# Patient Record
Sex: MALE | Race: BLACK OR AFRICAN AMERICAN | NOT HISPANIC OR LATINO | URBAN - METROPOLITAN AREA
[De-identification: names, ages, dates, MRNs, and addresses within clinical notes are randomized per-mention and may not be internally consistent; named-entity substitution may affect disease eponyms.]

---

## 2020-07-24 ENCOUNTER — HISTORICAL (OUTPATIENT)
Dept: ADMINISTRATIVE | Facility: HOSPITAL | Age: 60
End: 2020-07-24

## 2021-05-12 ENCOUNTER — HISTORICAL (OUTPATIENT)
Dept: ADMINISTRATIVE | Facility: HOSPITAL | Age: 61
End: 2021-05-12

## 2021-05-12 LAB
ABS NEUT (OLG): 1.7 X10(3)/MCL (ref 2.1–9.2)
ERYTHROCYTE [DISTWIDTH] IN BLOOD BY AUTOMATED COUNT: 14.4 % (ref 11.5–17)
HCT VFR BLD AUTO: 40.4 % (ref 42–52)
HGB BLD-MCNC: 13.1 GM/DL (ref 14–18)
LYMPHOCYTES # BLD AUTO: 2.2 X10(3)/MCL (ref 0.6–3.4)
LYMPHOCYTES NFR BLD AUTO: 50.5 % (ref 13–40)
MCH RBC QN AUTO: 25.1 PG (ref 27–31.2)
MCHC RBC AUTO-ENTMCNC: 32 GM/DL (ref 32–36)
MCV RBC AUTO: 77 FL (ref 80–94)
MONOCYTES # BLD AUTO: 0.4 X10(3)/MCL (ref 0.1–1.3)
MONOCYTES NFR BLD AUTO: 9.1 % (ref 0.1–24)
NEUTROPHILS NFR BLD AUTO: 40.4 % (ref 47–80)
PLATELET # BLD AUTO: 201 X10(3)/MCL (ref 130–400)
PMV BLD AUTO: 8.7 FL (ref 9.4–12.4)
RBC # BLD AUTO: 5.22 X10(6)/MCL (ref 4.7–6.1)
WBC # SPEC AUTO: 4.3 X10(3)/MCL (ref 4.5–11.5)

## 2021-08-31 ENCOUNTER — HISTORICAL (OUTPATIENT)
Dept: ADMINISTRATIVE | Facility: HOSPITAL | Age: 61
End: 2021-08-31

## 2021-08-31 LAB
ABS NEUT (OLG): 3.6 X10(3)/MCL (ref 2.1–9.2)
ALBUMIN SERPL-MCNC: 3.9 GM/DL (ref 3.4–5)
ALBUMIN/GLOB SERPL: 1.44 {RATIO} (ref 1.5–2.5)
ALP SERPL-CCNC: 50 UNIT/L (ref 38–126)
ALT SERPL-CCNC: 30 UNIT/L (ref 7–52)
APPEARANCE, UA: CLEAR
AST SERPL-CCNC: 23 UNIT/L (ref 15–37)
BACTERIA #/AREA URNS AUTO: ABNORMAL /HPF
BILIRUB SERPL-MCNC: 0.5 MG/DL (ref 0.2–1)
BILIRUB UR QL STRIP: NEGATIVE MG/DL
BILIRUBIN DIRECT+TOT PNL SERPL-MCNC: 0.1 MG/DL (ref 0–0.5)
BILIRUBIN DIRECT+TOT PNL SERPL-MCNC: 0.4 MG/DL
BUN SERPL-MCNC: 13 MG/DL (ref 7–18)
CALCIUM SERPL-MCNC: 8.8 MG/DL (ref 8.5–10.1)
CHLORIDE SERPL-SCNC: 102 MMOL/L (ref 98–107)
CHOLEST SERPL-MCNC: 177 MG/DL (ref 0–200)
CHOLEST/HDLC SERPL: 4.4 {RATIO}
CO2 SERPL-SCNC: 33 MMOL/L (ref 21–32)
COLOR UR: YELLOW
CREAT SERPL-MCNC: 1.08 MG/DL (ref 0.6–1.3)
ERYTHROCYTE [DISTWIDTH] IN BLOOD BY AUTOMATED COUNT: 14.3 % (ref 11.5–17)
EST CREAT CLEARANCE SER (OHS): 101.59 ML/MIN
EST. AVERAGE GLUCOSE BLD GHB EST-MCNC: 126 MG/DL
GLOBULIN SER-MCNC: 2.7 GM/DL (ref 1.2–3)
GLUCOSE (UA): NEGATIVE MG/DL
GLUCOSE SERPL-MCNC: 106 MG/DL (ref 74–106)
HBA1C MFR BLD: 6 % (ref 4.4–6.4)
HCT VFR BLD AUTO: 42.7 % (ref 42–52)
HDLC SERPL-MCNC: 40 MG/DL (ref 35–60)
HGB BLD-MCNC: 13.9 GM/DL (ref 14–18)
HGB UR QL STRIP: NEGATIVE UNIT/L
KETONES UR QL STRIP: ABNORMAL MG/DL
LDLC SERPL CALC-MCNC: 97 MG/DL (ref 0–129)
LEUKOCYTE ESTERASE UR QL STRIP: NEGATIVE UNIT/L
LYMPHOCYTES # BLD AUTO: 2.1 X10(3)/MCL (ref 0.6–3.4)
LYMPHOCYTES NFR BLD AUTO: 34.8 % (ref 13–40)
MCH RBC QN AUTO: 24.7 PG (ref 27–31.2)
MCHC RBC AUTO-ENTMCNC: 33 GM/DL (ref 32–36)
MCV RBC AUTO: 76 FL (ref 80–94)
MONOCYTES # BLD AUTO: 0.4 X10(3)/MCL (ref 0.1–1.3)
MONOCYTES NFR BLD AUTO: 7.3 % (ref 0.1–24)
NEUTROPHILS NFR BLD AUTO: 57.9 % (ref 47–80)
NITRITE UR QL STRIP.AUTO: NEGATIVE
PH UR STRIP: 7 [PH]
PLATELET # BLD AUTO: 267 X10(3)/MCL (ref 130–400)
PMV BLD AUTO: 8.5 FL (ref 9.4–12.4)
POTASSIUM SERPL-SCNC: 3.7 MMOL/L (ref 3.5–5.1)
PROT SERPL-MCNC: 6.6 GM/DL (ref 6.4–8.2)
PROT UR QL STRIP: ABNORMAL MG/DL
PSA SERPL-MCNC: 0.65 NG/ML (ref 0–4.5)
RBC # BLD AUTO: 5.62 X10(6)/MCL (ref 4.7–6.1)
RBC #/AREA URNS HPF: ABNORMAL /HPF
SODIUM SERPL-SCNC: 140 MMOL/L (ref 136–145)
SP GR UR STRIP: 1.02
SQUAMOUS EPITHELIAL, UA: ABNORMAL /LPF
TRIGL SERPL-MCNC: 133 MG/DL (ref 30–150)
UROBILINOGEN UR STRIP-ACNC: 0.2 MG/DL
VLDLC SERPL CALC-MCNC: 26.6 MG/DL
WBC # SPEC AUTO: 6.1 X10(3)/MCL (ref 4.5–11.5)
WBC #/AREA URNS AUTO: ABNORMAL /[HPF]

## 2022-01-26 ENCOUNTER — HISTORICAL (OUTPATIENT)
Dept: ADMINISTRATIVE | Facility: HOSPITAL | Age: 62
End: 2022-01-26

## 2022-02-07 ENCOUNTER — HISTORICAL (OUTPATIENT)
Dept: ADMINISTRATIVE | Facility: HOSPITAL | Age: 62
End: 2022-02-07

## 2022-02-07 LAB
CBG: 102 (ref 70–115)
CBG: 95 (ref 70–115)

## 2022-02-23 ENCOUNTER — HISTORICAL (OUTPATIENT)
Dept: ADMINISTRATIVE | Facility: HOSPITAL | Age: 62
End: 2022-02-23

## 2022-02-23 LAB
CBG: 86 (ref 70–115)
CBG: 95 (ref 70–115)

## 2022-04-09 ENCOUNTER — HISTORICAL (OUTPATIENT)
Dept: ADMINISTRATIVE | Facility: HOSPITAL | Age: 62
End: 2022-04-09
Payer: COMMERCIAL

## 2022-04-25 VITALS
OXYGEN SATURATION: 98 % | SYSTOLIC BLOOD PRESSURE: 126 MMHG | DIASTOLIC BLOOD PRESSURE: 82 MMHG | WEIGHT: 232.56 LBS | HEIGHT: 70 IN | BODY MASS INDEX: 33.29 KG/M2

## 2022-05-02 NOTE — HISTORICAL [facility]
This is a historical note converted from [employer]. Formatting and pictures may have been removed.  Please reference [employer]ka for original formatting and attached multimedia. History of Present Illness  Pt presents for Wellness exam.?  He has been feeling well.  Sleeps well.  Appetite is good.  He walks a few miles 3 x week.  No tobacco.  No alcohol.  No coffee/soft drinks.  Colonoscopy 5/2021: Dr Andrew, normal,?follow up 10 years  He is remarried.  .  Cardiologist: Dr Gomez; October?2020.  Review of Systems  Constitutional:??no?weight gain,??no?weight loss,??no?fatigue, ?no?fever, ?no?chills, ?no?weakness, ?no?trouble sleeping  Eyes: ?no?vision loss/changes,??+?glasses, readers,??no?pain,??no?redness,??no?blurry or double vision,??no?flashing lights,??no?glaucoma,??no?cataracts  Last eye exam:? 2020  Neck: ?no?lymphadenopathy,??no?thyroid abnormalities,??no?bruits,??no?stiffness  Ears:??no?decreased hearing,??no?tinnitus,??no?earache,??no?drainage?  Nose:??no?congestion,??no?rhinorrhea,??no?epistaxis,??no?sinus pressure  Throat/Oral:??no?sore throat,??no?hoarseness, ?no?dental caries,??no?gum bleeding,??no?oral lesions  Cardiovascular:??no?chest pain, palpitations, or tightness,??no?dyspnea with exertion,??no?orthopnea,??no?paroxysmal nocturnal dyspnea, + hypertension, + hypercholesteremia/hyperlipidemia, + carotid artery stenosis  Respiratory:??no?cough,??no?sputum,??no?hemoptysis,??no?dyspnea,??no?wheezing,??no?pleuritic chest pain?  Gastrointestinal:??no?abdominal pain,??no?nausea,??no?vomiting,??no?heartburn,??no?dysphagia or odynophagia,??no?diarrhea,??no?constipation,??no?melena,??no?hematochezia,?no?jaundice  Urinary:??no?frequency,??no?urgency,??no?burning or pain,?no?hematuria,??no?incontinence,??no?hesitancy,??no?incomplete voiding,??no?flank pain,??no?nocturia, + erectile dysfunction, takes medication as needed, works well  Musculoskeletal:?no?myalgias,??+?arthralgias: left knee, has been  doing well, ?no?neck pain,??no?back pain,??no?swelling of extremities,  Skin:?no?rashes,??no?sores,??no?non-healing wounds, itchy areas over right scapula  Neurologic:??no?headaches, much better,??no?dizziness/lightheadedness,??no?tremors,??no?paresthesias,??no?seizures,??no?muscle weakness  Psychiatric:??no?depression/sadness,??no?anhedonia,??no?irritability,??no?suicidal ideations,??no?anxiety,??no?panic attacks  Endocrine:??no?hot or cold intolerance,??no?sweating,??no?polyuria,??no?polydipsia,??no?polyphagia, + hyperglycemia  Hematologic:??no?bruising,??no?bleeding disorders?  Physical Exam  Vitals & Measurements  T:?36.7? ?C (Oral)? HR:?60(Peripheral)? BP:?116/70? SpO2:?98%?  HT:?177.00?cm? WT:?100.000?kg? BMI:?31.92?  ?  GENERAL: The patient is a well-developed, well-nourished obese  male in no?apparent distress. He is alert and oriented x 4.  HEENT: Head is normocephalic and atraumatic. Extraocular muscles are intact. Pupils are equal, round, and reactive to light and accommodation. Nares appeared normal. Mouth is well hydrated and without lesions. Mucous membranes are moist. Posterior pharynx clear of any exudate or lesions.  NECK: Supple. No carotid bruits.? No lymphadenopathy or thyromegaly.  LUNGS: Clear to auscultation.  HEART: Regular rate and rhythm without murmur, gallops or rubs.  ABDOMEN: Soft, nontender,?obese and nondistended.? Positive bowel sounds.? No hepatosplenomegaly was noted.  EXTREMITIES: Without any cyanosis, clubbing, rash, lesions or edema.  NEUROLOGIC: Cranial nerves II through XII are grossly intact.? No motor or sensory deficits.? Cerebellar function intact.  SKIN: No ulceration or induration present.  :? Normal male genitalia, no hernias,?testes descended with normal size and consistency.??NST,??prostate soft, smooth and without nodules.  ?  Assessment/Plan  1.?Wellness examination?Z00.00  Patient presents for wellness examination.  He has been doing well.  He has  been vaccinated for COVID.  Patient encouraged to increase physical activity, exercise and lose weight.  He had a colonoscopy in May.  Shingrix 0.5 IM administered today.  Labs pending.  ?  Ordered:  CBC w/ Auto Diff, Routine collect, 08/31/21 8:04:00 CDT, Blood, Order for future visit, Stop date 08/31/21 8:04:00 CDT, Lab Collect, Wellness examination  HTN (hypertension), 08/31/21 8:04:00 CDT  Clinic Follow-Up Wellness, *Est. 08/31/22 3:00:00 CDT, Order for future visit, Wellness examination, ink AFP  Comprehensive Metabolic Panel, Routine collect, 08/31/21 8:04:00 CDT, Blood, Order for future visit, Stop date 08/31/21 8:04:00 CDT, Lab Collect, Wellness examination  HTN (hypertension)  Hyperglycemia, 08/31/21 8:04:00 CDT  Lab Collection Request, 08/31/21 8:04:00 CDT, [facility] AMB - AFP, 08/31/21 8:04:00 CDT, Wellness examination  HTN (hypertension)  High cholesterol  Hyperglycemia  Lipid Panel, Routine collect, 08/31/21 8:04:00 CDT, Blood, Order for future visit, Stop date 08/31/21 8:04:00 CDT, Lab Collect, Wellness examination  High cholesterol  HTN (hypertension)  Carotid artery stenosis  Hyperglycemia, 08/31/21 8:04:00 CDT  Preventative Health Care Est 40-64 years [id number] PC, Wellness examination  HTN (hypertension)  High cholesterol  Carotid artery stenosis  Erectile dysfunction  Hyperglycemia  Immunization due, [facility] AMB - AFP, 08/31/21 8:04:00 CDT  Prostate Specific Antigen, Routine collect, 08/31/21 8:04:00 CDT, Blood, Order for future visit, Stop date 08/31/21 8:04:00 CDT, Lab Collect, Wellness examination, 08/31/21 8:04:00 CDT  Urinalysis no Reflex, Routine collect, Urine, Order for future visit, 08/31/21 8:04:00 CDT, Stop date 08/31/21 8:04:00 CDT, Nurse collect, Wellness examination  HTN (hypertension)  Hyperglycemia  ?  2.?HTN (hypertension)?I10  Well-controlled; continue current medications. ?Refills sent.  Lose weight.? Decrease?sodium intake.? Increase physical activity.  Ordered:  MADINA  w/ Auto Diff, Routine collect, 08/31/21 8:04:00 CDT, Blood, Order for future visit, Stop date 08/31/21 8:04:00 CDT, Lab Collect, Wellness examination  HTN (hypertension), 08/31/21 8:04:00 CDT  Comprehensive Metabolic Panel, Routine collect, 08/31/21 8:04:00 CDT, Blood, Order for future visit, Stop date 08/31/21 8:04:00 CDT, Lab Collect, Wellness examination  HTN (hypertension)  Hyperglycemia, 08/31/21 8:04:00 CDT  Lab Collection Request, 08/31/21 8:04:00 CDT, [facility] AMB - AFP, 08/31/21 8:04:00 CDT, Wellness examination  HTN (hypertension)  High cholesterol  Hyperglycemia  Lipid Panel, Routine collect, 08/31/21 8:04:00 CDT, Blood, Order for future visit, Stop date 08/31/21 8:04:00 CDT, Lab Collect, Wellness examination  High cholesterol  HTN (hypertension)  Carotid artery stenosis  Hyperglycemia, 08/31/21 8:04:00 CDT  Preventative Health Care Est 40-64 years [id number] PC, Wellness examination  HTN (hypertension)  High cholesterol  Carotid artery stenosis  Erectile dysfunction  Hyperglycemia  Immunization due, [facility] AMB - AFP, 08/31/21 8:04:00 CDT  Urinalysis no Reflex, Routine collect, Urine, Order for future visit, 08/31/21 8:04:00 CDT, Stop date 08/31/21 8:04:00 CDT, Nurse collect, Wellness examination  HTN (hypertension)  Hyperglycemia  ?  3.?High cholesterol?E78.00  Continue low-cholesterol diet.  Continue Zetia; refills sent.  Labs pending.  Ordered:  Lab Collection Request, 08/31/21 8:04:00 CDT, [facility] AMB - AFP, 08/31/21 8:04:00 CDT, Wellness examination  HTN (hypertension)  High cholesterol  Hyperglycemia  Lipid Panel, Routine collect, 08/31/21 8:04:00 CDT, Blood, Order for future visit, Stop date 08/31/21 8:04:00 CDT, Lab Collect, Wellness examination  High cholesterol  HTN (hypertension)  Carotid artery stenosis  Hyperglycemia, 08/31/21 8:04:00 CDT  Preventative Health Care Est 40-64 years [id number] PC, Wellness examination  HTN (hypertension)  High cholesterol  Carotid artery stenosis   Erectile dysfunction  Hyperglycemia  Immunization due, [facility] AMB - AFP, 08/31/21 8:04:00 CDT  ?  4.?Carotid artery stenosis?I65.29  ?Followed by Dr. Gomez?who is retired.? Will refer to Dr. Benedict.  Ordered:  Lipid Panel, Routine collect, 08/31/21 8:04:00 CDT, Blood, Order for future visit, Stop date 08/31/21 8:04:00 CDT, Lab Collect, Wellness examination  High cholesterol  HTN (hypertension)  Carotid artery stenosis  Hyperglycemia, 08/31/21 8:04:00 CDT  Preventative Health Care Est 40-64 years [id number] PC, Wellness examination  HTN (hypertension)  High cholesterol  Carotid artery stenosis  Erectile dysfunction  Hyperglycemia  Immunization due, [facility] AMB - AFP, 08/31/21 8:04:00 CDT  ?  5.?Erectile dysfunction?N52.9  Patient has good results with sildenafil; refills sent.  Ordered:  Preventative Health Care Est 40-64 years [id number] PC, Wellness examination  HTN (hypertension)  High cholesterol  Carotid artery stenosis  Erectile dysfunction  Hyperglycemia  Immunization due, [facility] AMB - AFP, 08/31/21 8:04:00 CDT  ?  6.?Hyperglycemia?R73.9  A1c pending.  Patient has?been out of his Metformin?for 6 months.?  Limit intake of sweets and starches.  Increase physical activity, exercise and lose weight.  Ordered:  Comprehensive Metabolic Panel, Routine collect, 08/31/21 8:04:00 CDT, Blood, Order for future visit, Stop date 08/31/21 8:04:00 CDT, Lab Collect, Wellness examination  HTN (hypertension)  Hyperglycemia, 08/31/21 8:04:00 CDT  Hemoglobin A1c, Routine collect, 08/31/21 8:04:00 CDT, Blood, Order for future visit, Stop date 08/31/21 8:04:00 CDT, Lab Collect, Hyperglycemia, 08/31/21 8:04:00 CDT  Lab Collection Request, 08/31/21 8:04:00 CDT, [facility] AMB - AFP, 08/31/21 8:04:00 CDT, Wellness examination  HTN (hypertension)  High cholesterol  Hyperglycemia  Lipid Panel, Routine collect, 08/31/21 8:04:00 CDT, Blood, Order for future visit, Stop date 08/31/21 8:04:00 CDT, Lab Collect, Wellness examination   High cholesterol  HTN (hypertension)  Carotid artery stenosis  Hyperglycemia, 08/31/21 8:04:00 CDT  Preventative Health Care Est 40-64 years [id number] PC, Wellness examination  HTN (hypertension)  High cholesterol  Carotid artery stenosis  Erectile dysfunction  Hyperglycemia  Immunization due, [facility] AMB - AFP, 08/31/21 8:04:00 CDT  Urinalysis no Reflex, Routine collect, Urine, Order for future visit, 08/31/21 8:04:00 CDT, Stop date 08/31/21 8:04:00 CDT, Nurse collect, Wellness examination  HTN (hypertension)  Hyperglycemia  ?  7.?Immunization due?Z23  ?Shingrix 0.5 IM administered; benefits/potential risk/side effect discussed with patient.  Patient advised to return in 2-6 months for second Shingrix vaccine.  Ordered:  zoster vaccine, inactivated, 0.5 mL, IM, Once-NOW, first dose 08/31/21 8:03:00 CDT, stop date 08/31/21 8:03:00 CDT  [facility] Est 40-64 years [id number] PC, Wellness examination  HTN (hypertension)  High cholesterol  Carotid artery stenosis  Erectile dysfunction  Hyperglycemia  Immunization due, [facility] AMB - AFP, 08/31/21 8:04:00 CDT  ?  Orders:  amLODIPine, See Instructions, Take 1 tablet by mouth once daily, # 90 tab(s), 3 Refill(s)  ezetimibe, 10 mg = 1 tab(s), Oral, Daily, # 90 tab(s), 3 Refill(s), Pharmacy: [facility], 177, cm, Height/Length Dosing, 08/31/21 7:34:00 CDT, 100, kg, Weight Dosing, 08/31/21 7:34:00 CDT  hydrochlorothiazide-losartan, See Instructions, Take 1 tablet by mouth once daily, # 90 tab(s), 3 Refill(s), Pharmacy: [facility], 177, cm, Height/Length Dosing, 08/31/21 7:34:00 CDT, 100, kg, Weight Dosing, 08/31/21 7:34:00 CDT  metFORMIN, 500 mg = 1 tab(s), Oral, Daily, with meals, # 90 tab(s), 3 Refill(s), Pharmacy: [facility], 177, cm, Height/Length Dosing, 08/31/21 7:34:00 CDT, 100, kg, Weight Dosing, 08/31/21 7:34:00 CDT  metoprolol, 100 mg = 1 tab(s), Oral, Daily, # 90 tab(s), 3 Refill(s), Pharmacy: [facility], 177,  cm, Height/Length Dosing, 08/31/21 7:34:00 CDT, 100, kg, Weight Dosing, 08/31/21 7:34:00 CDT  sildenafil, See Instructions, Take 2-5 tablets 30 minutes to 1 hour prior to sexual activity., # 30 tab(s), 5 Refill(s), Pharmacy: [id number], 177, cm, Height/Length Dosing, 08/31/21 7:34:00 CDT, 100, kg, Weight Dosing, 08/31/21 7:34:00 CDT  Referrals  Clinic Follow-Up Wellness, *Est. 08/31/22 3:00:00 CDT, Order for future visit, Wellness examination, [facility]Link AFP   Problem List/Past Medical History  Ongoing  Carotid artery stenosis  Erectile dysfunction  High cholesterol  HTN (hypertension)  Hyperglycemia  Obesity  Historical  Closed fracture of right clavicle  Laceration of hand  Medication side effect  Procedure/Surgical History  Colonoscopy (05/18/2021)  Repair Right Hand Subcutaneous Tissue and Fascia, Open Approach (10/20/2018)  Simple repair of superficial wounds of scalp, neck, axillae, external genitalia, trunk and/or extremities (including hands and feet); 2.5 cm or less (10/20/2018)  Repair Right Hand Skin, External Approach (08/18/2016)  Simple repair of superficial wounds of scalp, neck, axillae, external genitalia, trunk and/or extremities (including hands and feet); 2.5 cm or less (08/18/2016)  Colonoscopy (04/11/2011)  left arm surgery   Medications  amlodipine 5 mg oral tablet, See Instructions, 3 refills  hydrochlorothiazide-losartan 25 mg-100 mg oral tablet, See Instructions, 3 refills  metformin 500 mg oral tablet, 500 mg= 1 tab(s), Oral, Daily, 3 refills  metoprolol tartrate 100 mg oral tab, 100 mg= 1 tab(s), Oral, Daily, 3 refills  sildenafil 20 mg oral tablet, See Instructions, 5 refills  Zetia 10 mg oral tablet, 10 mg= 1 tab(s), Oral, Daily, 3 refills  Allergies  No Known Medication Allergies  Social History  Abuse/Neglect  No, 07/24/2020  Alcohol - Denies Alcohol Use, 10/14/2012  Employment/School  Employed, Work/School description: ., 07/18/2018  Exercise  Exercise  frequency: 3-4 times/week. Exercise type: Walking., 07/18/2018  Home/Environment - Low Risk, 10/14/2012  Living situation: Home/Independent., 07/18/2018  Nutrition/Health  Regular, Caffeine intake amount: 1 CUP OF TEA PER DAY., 07/18/2018  Substance Use - Denies Substance Abuse, 10/14/2012  Tobacco - Denies Tobacco Use, 10/14/2012  Never (less than 100 in lifetime), N/A, 07/24/2020  Immunizations  Vaccine Date Status   zoster vaccine, inactivated 08/31/2021 Given   COVID-19 MRNA, LNP-S, PF- Pfizer 04/06/2021 Given   COVID-19 MRNA, LNP-S, PF- Pfizer 03/16/2021 Given   tetanus/diphtheria/pertussis, acel(Tdap) 10/20/2018 Given   influenza virus vaccine, inactivated 10/01/2018 Recorded   influenza virus vaccine, inactivated 11/09/2016 Recorded   tetanus/diphtheria/pertussis, acel(Tdap) 08/18/2016 Given   pneumococcal 13-valent conjugate vaccine 06/16/2015 Recorded   varicella virus vaccine 03/02/2009 Recorded   measles/mumps/rubella virus vaccine 02/17/2009 Recorded   tetanus/diphtheria/pertussis, acel(Tdap) 02/17/2009 Recorded   varicella virus vaccine 09/03/2004 Recorded   tetanus-diphth toxoids (Td) adult/adol 05/21/2004 Recorded   typhoid vaccine, inactivated 05/21/2004 Recorded   meningococcal conjugate vaccine 05/21/2004 Recorded   varicella virus vaccine 05/21/2004 Recorded   hepatitis A adult vaccine 05/21/2004 Recorded   poliovirus vaccine, live, trivalent 05/21/2004 Recorded   hepatitis B adult vaccine 11/01/2001 Recorded   hepatitis B adult vaccine 06/01/2001 Recorded   hepatitis B adult vaccine 05/02/2001 Recorded   measles virus vaccine 11/13/2000 Recorded   measles virus vaccine 10/11/2000 Recorded   Health Maintenance  Health Maintenance  ???Pending?(in the next year)  ??? ??OverDue  ??? ? ? ?Hypertension Management-BMP due??07/21/20??and every 1??year(s)  ??? ? ? ?Alcohol Misuse Screening due??01/02/21??and every 1??year(s)  ??? ??Due?  ??? ? ? ?Diabetes Screening due??07/24/21??and every 1??year(s)  ???  ? ? ?ADL Screening due??08/31/21??and every 1??year(s)  ??? ? ? ?Aspirin Therapy for CVD Prevention due??08/31/21??and every 1??year(s)  ??? ? ? ?Coronary Artery Disease Maintenance-Antiplatelet Agent Prescribed due??08/31/21??Unknown Frequency  ??? ? ? ?Depression Screening due??08/31/21??Unknown Frequency  ??? ? ? ?Zoster Vaccine due??08/31/21??Unknown Frequency  ??? ??Due In Future?  ??? ? ? ?Obesity Screening not due until??01/01/22??and every 1??year(s)  ???Satisfied?(in the past 1 year)  ??? ??Satisfied?  ??? ? ? ?Blood Pressure Screening on??08/31/21.??Satisfied by Ligia Sanchez LPN  ??? ? ? ?Body Mass Index Check on??08/31/21.??Satisfied by Ligia Sanchez LPN  ??? ? ? ?Colorectal Screening on??05/18/21.??Satisfied by Oralia Naranjo  ??? ? ? ?Coronary Artery Disease Maintenance-Lipid Lowering Therapy on??08/31/21.??Satisfied by Jonathan Ceron MD  ??? ? ? ?Hypertension Management-Blood Pressure on??08/31/21.??Satisfied by Ligia Sanchez LPN  ??? ? ? ?Obesity Screening on??08/31/21.??Satisfied by Ligia Sanchez LPN  ??? ? ? ?Zoster Vaccine on??08/31/21.??Satisfied by Ligia Sanchez LPN  ?

## 2022-05-02 NOTE — HISTORICAL [facility]
This is a historical note converted from [employer]. Formatting and pictures may have been removed.  Please reference Francheska for original formatting and attached multimedia. Chief Complaint  Annual wellness physical- NPO  History of Present Illness  Pt presents for Wellness exam.?  He has been feeling well.  Sleeps well.  Appetite is good.  He walks a few miles 3 x week.  No tobacco.  No alcohol.  No coffee/soft drinks.  Colonoscopy 2011: Dr Andrew, follow up 10 years  He is  but has gotten remarried.  .  Cardiologist: Dr Gomez, May 2019.  Review of Systems  Constitutional:??no?weight gain,??no?weight loss,??no?fatigue, ?no?fever, ?no?chills, ?no?weakness, ?no?trouble sleeping  Eyes: ?no?vision loss/changes,??+?glasses, readers,??no?pain,??no?redness,??no?blurry or double vision,??no?flashing lights,??no?glaucoma,??no?cataracts  Last eye exam:?November 2019  Neck: ?no?lymphadenopathy,??no?thyroid abnormalities,??no?bruits,??no?stiffness  Ears:??no?decreased hearing,??no?tinnitus,??no?earache,??no?drainage?  Nose:??no?congestion,??no?rhinorrhea,??no?epistaxis,??no?sinus pressure  Throat/Oral:??no?sore throat,??no?hoarseness, ?no?dental caries,??no?gum bleeding,??no?oral lesions  Cardiovascular:??no?chest pain, palpitations, or tightness,??no?dyspnea with exertion,??no?orthopnea,??no?paroxysmal nocturnal dyspnea, + hypertension, + hypercholesteremia/hyperlipidemia, + carotid artery stenosis  Respiratory:??no?cough,??no?sputum,??no?hemoptysis,??no?dyspnea,??no?wheezing,??no?pleuritic chest pain?  Gastrointestinal:??no?abdominal pain,??no?nausea,??no?vomiting,??no?heartburn,??no?dysphagia or odynophagia,??no?diarrhea,??no?constipation,??no?melena,??no?hematochezia,?no?jaundice  Urinary:??no?frequency,??no?urgency,??no?burning or pain,?no?hematuria,??no?incontinence,??no?hesitancy,??no?incomplete voiding,??no?flank pain,??no?nocturia, + erectile dysfunction, takes medication as needed, works  well  Musculoskeletal:?no?myalgias,??+?arthralgias: left knee, feels some discomfort over the lateral aspect of knee for the past month, most noticeable when he walks,?no?neck pain,??no?back pain,??no?swelling of extremities, he states his leg muscles still hurt  Skin:?no?rashes,??no?sores,??no?non-healing wounds, itchy areas over right scapula  Neurologic:??+?headaches, tension, much better,??no?dizziness/lightheadedness,??no?tremors,??no?paresthesias,??no?seizures,??no?muscle weakness  Psychiatric:??no?depression/sadness,??no?anhedonia,??no?irritability,??no?suicidal ideations,??no?anxiety,??no?panic attacks  Endocrine:??no?hot or cold intolerance,??no?sweating,??no?polyuria,??no?polydipsia,??no?polyphagia, + hyperglycemia  Hematologic:??no?bruising,??no?bleeding disorders?  Physical Exam  Vitals & Measurements  T:?36.8? ?C (Oral)? HR:?60(Peripheral)? BP:?100/60?  HT:?177?cm? WT:?102.1?kg? BMI:?32.59?  ?  GENERAL: The patient is a well-developed, well-nourished obese  male in no?apparent distress. He is alert and oriented x 4.  HEENT: Head is normocephalic and atraumatic. Extraocular muscles are intact. Pupils are equal, round, and reactive to light and accommodation. Nares appeared normal. Mouth is well hydrated and without lesions. Mucous membranes are moist. Posterior pharynx clear of any exudate or lesions.  NECK: Supple. No carotid bruits.? No lymphadenopathy or thyromegaly.  LUNGS: Clear to auscultation.  HEART: Regular rate and rhythm without murmur, gallops or rubs.  ABDOMEN: Soft, nontender, and nondistended.? Positive bowel sounds.? No hepatosplenomegaly was noted.  EXTREMITIES: Without any cyanosis, clubbing, rash, lesions or edema. ?Left knee:?He has mild?swelling?just lateral?to his patella.? He is mildly tender over this area.? He has no joint line tenderness.? Negative Lachmans and McMurrays.  NEUROLOGIC: Cranial nerves II through XII are grossly intact.? No motor or sensory  deficits.? Cerebellar function intact.  SKIN: No ulceration or induration present. ?Area of?dermatitis over right scapula.  :? Normal male genitalia, no hernias,?testes descended with normal size and consistency.??NST,??prostate soft, smooth and without nodules, exam limited.  ?  Assessment/Plan  1.?Wellness examination?Z00.00  ?Patient presents for wellness examination.  He has been feeling well overall.  He continues to have diffuse leg discomfort.  He reports some left knee discomfort.  He has an area of dermatitis over his back.  He is up-to-date with his vaccinations.  Patient encouraged to be more physically active, exercise and lose weight.  He will be due for colonoscopy next year.  Labs pending.  Ordered:  Clinic Follow-Up Wellness, *Est. 07/24/21 3:00:00 CDT, Order for future visit, Wellness examination, [facility]  Preventative Health Care Est 40-64 years [id number] PC, Wellness examination  HTN (hypertension)  High cholesterol  Hyperglycemia  Carotid artery stenosis  Erectile dysfunction  Obesity, [facility]B - [facility], 07/24/20 9:38:00 CDT  Prostate Specific Antigen, Routine collect, 07/24/20 9:54:00 CDT, Blood, Stop date 07/24/20 9:54:00 CDT, Lab Collect, Wellness examination, 07/24/20 9:54:00 CDT  ?  2.?HTN (hypertension)?I10  ?Well-controlled; continue current medication.  Ordered:  Preventative Health Care Est 40-64 years [id number] PC, Wellness examination  HTN (hypertension)  High cholesterol  Hyperglycemia  Carotid artery stenosis  Erectile dysfunction  Obesity, [facility] - [facility], 07/24/20 9:38:00 CDT  ?  3.?High cholesterol?E78.00  Lipid profile pending.  Ordered:  Preventative Health Care Est 40-64 years [id number] PC, Wellness examination  HTN (hypertension)  High cholesterol  Hyperglycemia  Carotid artery stenosis  Erectile dysfunction  Obesity, [employer] AMB - [facility], 07/24/20 9:38:00 CDT  ?  4.?Hyperglycemia?R73.9  ?A1c pending.  Patient advised to?decrease his intake of sweets and starches as well as  lose weight.  Ordered:  Preventative Health Care Est 40-64 years [id number] PC, Wellness examination  HTN (hypertension)  High cholesterol  Hyperglycemia  Carotid artery stenosis  Erectile dysfunction  Obesity, [facility] AMB - [facility], 07/24/20 9:38:00 CDT  ?  5.?Carotid artery stenosis?I65.29  ?Followed by cardiology.  Ordered:  Preventative Health Care Est 40-64 years [id number] PC, Wellness examination  HTN (hypertension)  High cholesterol  Hyperglycemia  Carotid artery stenosis  Erectile dysfunction  Obesity, [facility] AMB - [facility], 07/24/20 9:38:00 CDT  ?  6.?Erectile dysfunction?N52.9  ?He has good results with medication; refills for sildenafil sent to [facility].  Ordered:  Preventative Health Care Est 40-64 years [id number] PC, Wellness examination  HTN (hypertension)  High cholesterol  Hyperglycemia  Carotid artery stenosis  Erectile dysfunction  Obesity, [facility] AMB - [facility], 07/24/20 9:38:00 CDT  ?  7.?Obesity?E66.9  Patient encouraged to lose weight, be more physically active and exercise.  Ordered:  Preventative Health Care Est 40-64 years [id number] PC, Wellness examination  HTN (hypertension)  High cholesterol  Hyperglycemia  Carotid artery stenosis  Erectile dysfunction  Obesity, [facility] AMB - [facility], 07/24/20 9:38:00 CDT  ?  8.?Left knee pain?M25.562  ?Probably arthritic in nature.  X-rays pending.  Patient advised to take Aleve?twice a day.  ?  9.?Dermatitis?L30.9  Elocon cream?0.5%: Apply to affected area daily.  ?  Orders:  amLODIPine, 5 mg = 1 tab(s), Oral, Daily, # 90 tab(s), 3 Refill(s), Pharmacy: [facility], 177, cm, Height/Length Dosing, 07/24/20 8:58:00 CDT, 102.1, kg, Weight Dosing, 07/24/20 8:58:00 CDT  atorvastatin, 20 mg = 1 tab(s), Oral, Daily, # 90 tab(s), 3 Refill(s), Pharmacy: [facility], 177, cm, Height/Length Dosing, 07/24/20 8:58:00 CDT, 102.1, kg, Weight Dosing, 07/24/20 8:58:00 CDT  hydrochlorothiazide-losartan, 1 tab(s), Oral, Daily, # 90 tab(s), 3 Refill(s), Pharmacy: [facility], 177, cm, Height/Length Dosing, 07/24/20 8:58:00 CDT, 102.1, kg, Weight Dosing, 07/24/20 8:58:00 CDT  metFORMIN, 500 mg = 1 tab(s), Oral, Daily, with meals, # 90 tab(s), 3 Refill(s), Pharmacy: [facility], 177, cm, Height/Length Dosing, 07/24/20 8:58:00 CDT, 102.1, kg, Weight Dosing, 07/24/20 8:58:00 CDT  metoprolol, 100 mg = 1 tab(s), Oral, Daily, # 90 tab(s), 3 Refill(s), Pharmacy: [facility], 177, cm, Height/Length Dosing, 07/24/20 8:58:00 CDT, 102.1, kg, Weight Dosing, 07/24/20 8:58:00 CDT  mometasone topical, 1 ivan, TOP, Daily, # 15 gm, 0 Refill(s), Pharmacy: [facility], 177, cm, Height/Length Dosing, 07/24/20 8:58:00 CDT, 102.1, kg, Weight Dosing, 07/24/20 8:58:00 CDT  sildenafil, See Instructions, Take 2-5 tablets 30 minutes to 1 hour prior to sexual activity., # 30 tab(s), 5 Refill(s), Pharmacy: [id number], 177, cm, Height/Length Dosing, 07/24/20 8:58:00 CDT, 102.1, kg, Weight Dosing, 07/24/20 8:58:00 CDT  Referrals  Clinic Follow up, Order for future visit  Clinic Follow-Up Wellness, *Est. 07/24/21 3:00:00 CDT, Order for future visit, Wellness examination, [facility]   Problem List/Past Medical History  Ongoing  Carotid artery stenosis  Erectile dysfunction  High cholesterol  HTN (hypertension)  Hyperglycemia  Obesity  Tension headache  Historical  Closed fracture of right clavicle  Laceration of hand  Medication side effect  Procedure/Surgical History  Repair Right Hand Subcutaneous Tissue and Fascia, Open Approach (10/20/2018)  Simple repair of superficial wounds of scalp, neck, axillae, external genitalia, trunk and/or extremities (including hands and feet); 2.5 cm or less (10/20/2018)  Repair Right Hand Skin, External Approach (08/18/2016)  Simple repair of superficial wounds of scalp, neck, axillae, external genitalia, trunk and/or extremities (including hands and feet); 2.5 cm or less (08/18/2016)  Colonoscopy (04/11/2011)  left arm surgery    Medications  acetaminophen-butalbital 325 mg-50 mg oral tablet, 1 tab(s), Oral, q6hr, PRN, 1 refills  amlodipine 5 mg oral tablet, 5 mg= 1 tab(s), Oral, Daily, 3 refills  atorvastatin 20 mg oral tablet, 20 mg= 1 tab(s), Oral, Daily, 3 refills  Elocon 0.1% topical cream, 1 ivan, TOP, Daily  hydrochlorothiazide-losartan 25 mg-100 mg oral tablet, 1 tab(s), Oral, Daily, 3 refills  metformin 500 mg oral tablet, 500 mg= 1 tab(s), Oral, Daily, 3 refills  metoprolol tartrate 100 mg oral tab, 100 mg= 1 tab(s), Oral, Daily, 3 refills  sildenafil 20 mg oral tablet, See Instructions, 5 refills  Allergies  No Known Medication Allergies  Social History  Abuse/Neglect  No, 07/24/2020  Alcohol - Denies Alcohol Use, 10/14/2012  Employment/School  Employed, Work/School description: ., 07/18/2018  Exercise  Exercise frequency: 3-4 times/week. Exercise type: Walking., 07/18/2018  Home/Environment - Low Risk, 10/14/2012  Living situation: Home/Independent., 07/18/2018  Nutrition/Health  Regular, Caffeine intake amount: 1 CUP OF TEA PER DAY., 07/18/2018  Substance Use - Denies Substance Abuse, 10/14/2012  Tobacco - Denies Tobacco Use, 10/14/2012  Never (less than 100 in lifetime), N/A, 07/24/2020  Immunizations  Vaccine Date Status   tetanus/diphtheria/pertussis, acel(Tdap) 10/20/2018 Given   influenza virus vaccine, inactivated 10/01/2018 Recorded   influenza virus vaccine, inactivated 11/09/2016 Recorded   tetanus/diphtheria/pertussis, acel(Tdap) 08/18/2016 Given   pneumococcal 13-valent conjugate vaccine 06/16/2015 Recorded   varicella virus vaccine 03/02/2009 Recorded   measles/mumps/rubella virus vaccine 02/17/2009 Recorded   tetanus/diphtheria/pertussis, acel(Tdap) 02/17/2009 Recorded   varicella virus vaccine 09/03/2004 Recorded   tetanus-diphth toxoids (Td) adult/adol 05/21/2004 Recorded   typhoid vaccine, inactivated 05/21/2004 Recorded   meningococcal conjugate vaccine 05/21/2004 Recorded   varicella virus  vaccine 05/21/2004 Recorded   hepatitis A adult vaccine 05/21/2004 Recorded   poliovirus vaccine, live, trivalent 05/21/2004 Recorded   hepatitis B adult vaccine 11/01/2001 Recorded   hepatitis B adult vaccine 06/01/2001 Recorded   hepatitis B adult vaccine 05/02/2001 Recorded   measles virus vaccine 11/13/2000 Recorded   measles virus vaccine 10/11/2000 Recorded   Health Maintenance  Health Maintenance  ???Pending?(in the next year)  ??? ??OverDue  ??? ? ? ?Alcohol Misuse Screening due??01/02/20??and every 1??year(s)  ??? ? ? ?Hypertension Management-BMP due??07/21/20??and every 1??year(s)  ??? ??Due?  ??? ? ? ?ADL Screening due??07/24/20??and every 1??year(s)  ??? ? ? ?Aspirin Therapy for CVD Prevention due??07/24/20??and every 1??year(s)  ??? ? ? ?Coronary Artery Disease Maintenance-Antiplatelet Agent Prescribed due??07/24/20??and every?  ??? ? ? ?Depression Screening due??07/24/20??and every?  ??? ? ? ?Influenza Vaccine due??07/24/20??and every?  ??? ? ? ?Zoster Vaccine due??07/24/20??and every?  ??? ??Due In Future?  ??? ? ? ?Obesity Screening not due until??01/01/21??and every 1??year(s)  ??? ? ? ?Colorectal Screening not due until??04/08/21??and every 10??year(s)  ???Satisfied?(in the past 1 year)  ??? ??Satisfied?  ??? ? ? ?Blood Pressure Screening on??07/24/20.??Satisfied by Ligia Sanchez LPN  ??? ? ? ?Body Mass Index Check on??07/24/20.??Satisfied by Ligia Sanchez LPN  ??? ? ? ?Coronary Artery Disease Maintenance-Lipid Lowering Therapy on??07/24/20.??Satisfied by Jonathan Ceron MD  ??? ? ? ?Diabetes Screening on??07/24/20.??Satisfied by Adarsh Espino  ??? ? ? ?Hypertension Management-Blood Pressure on??07/24/20.??Satisfied by Ligia Sanchez LPN  ??? ? ? ?Obesity Screening on??07/24/20.??Satisfied by Ligia Sanchez LPN  ?      X-rays of left knee?reveal?a nonspecific moderate joint effusion. ?He has?3 compartment osteoarthritis, greatest in the patellofemoral compartment.

## 2022-05-14 NOTE — OP NOTE
Patient:   Gómez Priest             MRN: [id number]            FIN: [id number]               Age:   61 years     Sex:  Male     :  1960   Associated Diagnoses:   None   Author:   Dony Knight MD          Preoperative Diagnosis: Nuclear sclerotic cataract [Left / eye.    Postoperative Diagnosis: Same.    Anesthesia: Local    Procedure: Phacoemulsification of cataract with posterior chamber implant of the [Left/ eye.    This patient is a [  ] year old who was given a diagnosis of severe cataracts of both eyes with [  ] vision [  ]. The risks and benefits of cataract surgery were explained; the patient was consented and desired to have the surgery done. The patient was given topical anesthesia using 1% Lidocaine Jelly and the patient was prepped and draped in sterile fashion.    The microscope was centered and focused in a temporal position and a super sharp blade was used to make a paracentesis in the corneal limbus. Dispersive Viscoelastic was then placed in the anterior chamber. A 2.4 mm keratome blade was used to enter the anterior chamber in a self-sealing type technique. The cystatome blade was then used to initiate a capsulorrhexis which was completed 360 degrees with Utrata forceps. BSS in an AC cannula was then used to perform hydrodissection and hydrodilineation. Phacoemulsification was then accomplished by creating a deep groove down the middle of the nucleus, then  it into 2 halves with the phacotip and the Sepulveda chopper and finishing the removal with a stop and chop technique.  The cortex was then removed using the I and A unit. All the lens material was removed without any tears to the anterior or posterior capsule. Cohesive Viscoelastic was then injected to inflate the capsular bag. A foldable lens was then injected into the capsular bag. The lens was observed to be securely placed into the bag. The I and A was then used to remove the remaining viscoelastic. A 10-0 Biosorb  incisional suture [was not] placed. BSS through an A/C cannula was used to perform stromal hydration in the wound. BSS was also used again to reform the chamber and bring the eye to physiologic IOP.  The wound was checked for leaks and none were found. Copious Vigomox drop and 1-2 drops of, Prednisolone Acetate 1% were placed topically prior to removing the lid specular and drapes.  The drapes were then removed. The patient will be sent to recovery and instructed to use Vigamox, Ketorolac, and Predinsolone Acetate 1% three times a day as well as follow all instructions on the postoperative sheet given to them and explained after surgery. The patient will return to see Dr. Knight at their scheduled appointment within 24-36 hours after surgery.      Dony Knight M.D.              JESSICA/hannah           [date / time]

## 2022-05-14 NOTE — OP NOTE
Patient:   Gómez Priest             MRN: [id number]            FIN: [id number]               Age:   61 years     Sex:  Male     :  1960   Associated Diagnoses:   None   Author:   Dony Knight MD      Operative Note   Operative Information   Surgeon: Dony Knight MD.     Preoperative Diagnosis: Nuclear sclerotic cataract                   OS    Postoperative Diagnosis:  Same    Anesthesia:   Local   MAC      The patient was diagnosed with a significant cataract.         OS    The patient desired and I recommended surgical correction with laser-assisted cataract surgery.  After the patient's pupil was dilated and anesthetic drops were placed in the eye, ink marks were placed at 0 degrees, 90 degrees, 180 degrees on the conjunctivae using a titanium marker.     After the patient was transferred to the laser, properly positioned on the laser bed and the plan was reviewed, the Laser Optic Interface (L.O.I.) was aligned with the pre-placed marks and vacuum was employed.  Basic saline solution was used to fill the L.O.I. to the desired degree and the patient was aligned underneath the laser.  The control knob was used to raise the patient and insert the laser lens into the L.O.I.  The monitor was viewed for any bubbles that may interfere and corrections were made if necessary.  The L.O.I. was captured and locked and the eye was scanned.  After approval of the scan, the pre-programmed laser treatment was performed and completed.      The treatment was well-tolerated by the patient without any complications.  The patient was then transferred back to her stretcher and moved to the operating room.     Once in the operating room the patient was properly positioned and the head secured with paper tape.  After the microscope was positioned temporally and focused, the  OS eye was prepped and draped in sterile fashion.  After I was scrubbed, gowned, and gloved I began the operation by creating small  paracentesis port at the corneal limbus to accommodate my second instrument.  Dispersive viscoelastic was then used to fill the anterior chamber.  A Sinsky hook was then used to open the preplaced laser incision.  . The cystatome blade was then used to initiate a capsulorrhexis which was completed 360 degrees with Utrata forceps. BSS in an AC cannula was then used to perform hydrodissection and hydrodilineation. Phacoemulsification was then accomplished by creating a deep groove down the middle of the nucleus, then  it into 2 halves with the phacotip and the Sepulveda chopper and finishing the removal with a stop and chop technique.  The cortex was then removed using the I and A unit. All of the lens material was removed without any tears to the anterior or posterior capsule. Cohesive Viscoelastic was then injected to inflate the capsular bag. A foldable ZXROO SYMFONY lens was then injected into the capsular bag. The lens was observed to be securely placed into the bag. The I and A was then used to remove the remaining viscoelastic. A 10-0 Biosorb incisional suturewas not placed. BSS through an A/C cannula was used to perform stromal hydration in the wound. BSS was also used again to reform the chamber and bring the eye to physiologic IOP.  The wound was checked for leaks and none were found. Copious Vigomox drop and 1-2 drops of, Prednisolone Acetate 1% were placed topically prior to removing the lid specular and drapes.  The drapes were then removed. The patient will be sent to recovery and instructed to use Vigamox, Ketorolac, and Predinsolone Acetate 1% three times a day as well as follow all instructions on the postoperative sheet given to them and explained after surgery. The patient will return to see Dr. Knight at their scheduled appointment within 24-36 hours after surgery.        DANNY Gómez/hannah

## 2022-05-14 NOTE — OP NOTE
Patient:   Gómez Priest             MRN: [id number]            FIN: [id number]               Age:   61 years     Sex:  Male     :  1960   Associated Diagnoses:   None   Author:   Dony Knight MD      Operative Note   Operative Information   Surgeon: Dony Knight MD.     Preoperative Diagnosis: Nuclear sclerotic cataract          OD            Postoperative Diagnosis:  Same    Anesthesia:   Local   MAC      The patient was diagnosed with a significant cataract.      OD       The patient desired and I recommended surgical correction with laser-assisted cataract surgery.  After the patient's pupil was dilated and anesthetic drops were placed in the eye, ink marks were placed at 0 degrees, 90 degrees, 180 degrees on the conjunctivae using a titanium marker.     After the patient was transferred to the laser, properly positioned on the laser bed and the plan was reviewed, the Laser Optic Interface (L.O.I.) was aligned with the pre-placed marks and vacuum was employed.  Basic saline solution was used to fill the L.O.I. to the desired degree and the patient was aligned underneath the laser.  The control knob was used to raise the patient and insert the laser lens into the L.O.I.  The monitor was viewed for any bubbles that may interfere and corrections were made if necessary.  The L.O.I. was captured and locked and the eye was scanned.  After approval of the scan, the pre-programmed laser treatment was performed and completed.      The treatment was well-tolerated by the patient without any complications.  The patient was then transferred back to her stretcher and moved to the operating room.     Once in the operating room the patient was properly positioned and the head secured with paper tape.  After the microscope was positioned temporally and focused, the OD eye was prepped and draped in sterile fashion.  After I was scrubbed, gowned, and gloved I began the operation by creating small  paracentesis port at the corneal limbus to accommodate my second instrument.  Dispersive viscoelastic was then used to fill the anterior chamber.  A Sinsky hook was then used to open the preplaced laser incision.  . The cystatome blade was then used to initiate a capsulorrhexis which was completed 360 degrees with Utrata forceps. BSS in an AC cannula was then used to perform hydrodissection and hydrodilineation. Phacoemulsification was then accomplished by creating a deep groove down the middle of the nucleus, then  it into 2 halves with the phacotip and the Sepulveda chopper and finishing the removal with a stop and chop technique.  The cortex was then removed using the I and A unit. All of the lens material was removed without any tears to the anterior or posterior capsule. Cohesive Viscoelastic was then injected to inflate the capsular bag. A foldable lens was then injected into the capsular bag. The lens was observed to be securely placed into the bag. The I and A was then used to remove the remaining viscoelastic. A 10-0 Biosorb incisional suture was- placed. BSS through an A/C cannula was used to perform stromal hydration in the wound. BSS was also used again to reform the chamber and bring the eye to physiologic IOP.  The wound was checked for leaks and none were found. Copious Vigomox drop and 1-2 drops of, Prednisolone Acetate 1% were placed topically prior to removing the lid specular and drapes.  The drapes were then removed. The patient will be sent to recovery and instructed to use Vigamox, Ketorolac, and Predinsolone Acetate 1% three times a day as well as follow all instructions on the postoperative sheet given to them and explained after surgery. The patient will return to see Dr. Knight at their scheduled appointment within 24-36 hours after surgery.        DANNY Gómez/hannah

## 2022-06-14 PROBLEM — M25.512 ACUTE PAIN OF LEFT SHOULDER: Status: ACTIVE | Noted: 2022-06-14

## 2022-08-31 PROBLEM — Z00.00 ENCOUNTER FOR WELLNESS EXAMINATION IN ADULT: Status: ACTIVE | Noted: 2022-08-31

## 2022-12-05 PROBLEM — Z00.00 ENCOUNTER FOR WELLNESS EXAMINATION IN ADULT: Status: RESOLVED | Noted: 2022-08-31 | Resolved: 2022-12-05

## 2022-12-13 ENCOUNTER — PATIENT MESSAGE (OUTPATIENT)
Dept: RESEARCH | Facility: HOSPITAL | Age: 62
End: 2022-12-13
Payer: COMMERCIAL

## 2023-02-08 ENCOUNTER — PATIENT MESSAGE (OUTPATIENT)
Dept: RESEARCH | Facility: HOSPITAL | Age: 63
End: 2023-02-08
Payer: COMMERCIAL

## 2023-09-06 PROBLEM — Z11.59 ENCOUNTER FOR HEPATITIS C SCREENING TEST FOR LOW RISK PATIENT: Status: ACTIVE | Noted: 2023-09-06

## 2023-09-06 PROBLEM — Z11.4 SCREENING FOR HIV (HUMAN IMMUNODEFICIENCY VIRUS): Status: ACTIVE | Noted: 2023-09-06

## 2023-09-06 PROBLEM — E78.1 HYPERTRIGLYCERIDEMIA: Status: ACTIVE | Noted: 2023-09-06

## 2023-09-06 PROBLEM — R73.03 PREDIABETES: Status: ACTIVE | Noted: 2023-09-06

## 2023-09-06 PROBLEM — R73.9 HYPERGLYCEMIA: Status: ACTIVE | Noted: 2023-09-06

## 2023-09-06 PROBLEM — Z12.5 SCREENING PSA (PROSTATE SPECIFIC ANTIGEN): Status: ACTIVE | Noted: 2023-09-06

## 2023-09-06 PROCEDURE — 86803 HEPATITIS C AB TEST: CPT | Performed by: FAMILY MEDICINE

## 2023-09-06 PROCEDURE — 87389 HIV-1 AG W/HIV-1&-2 AB AG IA: CPT | Performed by: FAMILY MEDICINE

## 2023-09-07 ENCOUNTER — PATIENT MESSAGE (OUTPATIENT)
Dept: RESEARCH | Facility: HOSPITAL | Age: 63
End: 2023-09-07
Payer: COMMERCIAL

## 2023-12-11 PROBLEM — Z00.00 ENCOUNTER FOR WELLNESS EXAMINATION IN ADULT: Status: RESOLVED | Noted: 2022-08-31 | Resolved: 2023-12-11

## 2024-09-15 NOTE — PROGRESS NOTES
..Annual Exam (Bilateral lower extremity edema/Wants flu vaccine)       HPI:     Patient presents for wellness examination.    He has been feeling well.    He sleeps well.    His appetite is good.    He has been exercising only once a week. He states he has muscle pain, especially his legs.  He does not smoke.    He does not drink.   He has no coffee or soft drinks. He has 1 cup of tea daily.     Last colonoscopy 05/2021: Dr. Andrew; normal, follow-up in 10 years.   Cardio: Dr Anna; October 2022.    He is a .    He is remarried.    The patient's Health Maintenance was reviewed and the following appears to be due at this time:   Health Maintenance Due   Topic Date Due    Aspirin/Antiplatelet Therapy  Never done    High Dose Statin  Never done       ..  Past Medical History:   Diagnosis Date    Male erectile dysfunction, unspecified           ..  Past Surgical History:   Procedure Laterality Date    COLONOSCOPY  05/18/2021    COLONOSCOPY  04/11/2011    EYE SURGERY  02/26/2022    Cataract surgery on both eyes    FRACTURE SURGERY  2005    Broken arm    left arm surgery      Repair Right Hand Skin, External Approach   08/18/2016    Repair Right Hand Subcutaneous Tissue and Fascia, Open Approach   10/20/2018    Simple repair of superficial wounds of scalp, neck, axillae, external genitalia, trunk and/or extremities (including hands and feet); 2.5 cm or less   10/20/2018          Current Outpatient Medications   Medication Instructions    amLODIPine (NORVASC) 5 MG tablet See Instructions, Take 1 tablet by mouth once daily, # 90 tab(s), 3 Refill(s), Pharmacy: [facility], 177, cm, Height/Length Dosing, 08/31/21 7:34:00 CDT, 100, kg, Weight Dosing, 08/31/21 7:34:00 CDT Strength: 5 mg    aspirin 81 mg Cap Oral    ezetimibe (ZETIA) 10 mg tablet <BR>See Instructions, Take 1 tablet by mouth once daily, # 30 tab(s), 9 Refill(s), Pharmacy: [facility], 177, cm, Height/Length Dosing, 08/31/21  7:34:00 CDT, 100, kg, Weight Dosing, 08/31/21 7:34:00 CDT    losartan-hydrochlorothiazide 100-25 mg (HYZAAR) 100-25 mg per tablet See Instructions, Take 1 tablet by mouth once daily, # 90 tab(s), 3 Refill(s), Pharmacy: [facility], 177, cm, Height/Length Dosing, 08/31/21 7:34:00 CDT, 100, kg, Weight Dosing, 08/31/21 7:34:00 CDT Strength: 100-25 mg    metFORMIN (GLUCOPHAGE) 500 mg, Oral, With breakfast    metoprolol succinate (TOPROL-XL) 100 mg, Oral, Daily    sildenafil (REVATIO) 20 mg Tab TAKE 2 TO 5 TABLETS BY MOUTH 30 MINUTES TO 1 HOUR PRIOR TO SEXUAL ACTIVITY         ..  Social History     Socioeconomic History    Marital status:     Number of children: 3   Occupational History    Occupation:    Tobacco Use    Smoking status: Never    Smokeless tobacco: Never   Substance and Sexual Activity    Alcohol use: Never    Drug use: Never    Sexual activity: Yes     Partners: Female     Birth control/protection: None     Social Determinants of Health     Financial Resource Strain: Low Risk  (9/12/2024)    Overall Financial Resource Strain (CARDIA)     Difficulty of Paying Living Expenses: Not hard at all   Food Insecurity: No Food Insecurity (9/12/2024)    Hunger Vital Sign     Worried About Running Out of Food in the Last Year: Never true     Ran Out of Food in the Last Year: Never true   Physical Activity: Unknown (9/12/2024)    Exercise Vital Sign     Days of Exercise per Week: 1 day   Stress: No Stress Concern Present (9/12/2024)    [employer] - Occupational Stress Questionnaire     Feeling of Stress : Not at all   Housing Stability: Unknown (9/12/2024)    Housing Stability Vital Sign     Unable to Pay for Housing in the Last Year: No          ..  Family History   Problem Relation Name Age of Onset    No Known Problems Mother      Bone cancer Father Abdou     Cancer Father Abdou     Stroke Sister Carol Ann     No Known Problems Sister      No Known Problems  Sister      No Known Problems Sister      No Known Problems Sister      No Known Problems Brother      No Known Problems Brother      No Known Problems Brother      No Known Problems Brother            ..Review of patient's allergies indicates:  No Known Allergies       ..  Immunization History   Administered Date(s) Administered    COVID-19, MRNA, LN-S, PF (Pfizer) (Purple Cap) 03/16/2021, 04/06/2021, 12/02/2021    Hepatitis A, Adult 05/21/2004    Hepatitis B, Adult 05/02/2001, 06/01/2001, 11/01/2001    Influenza - Quadrivalent - PF *Preferred* (6 months and older) 10/01/2018    Influenza - Trivalent - PF (ADULT) 11/09/2016, 11/16/2021, 09/19/2024    Influenza A (H1N1) 2009 Monovalent - IM 12/10/2009    MMR 02/17/2009    Measles 10/11/2000, 11/13/2000    Meningococcal Conjugate (MCV4P) 05/21/2004    OPV 05/21/2004    Pneumococcal Conjugate - 13 Valent 06/16/2015    Td (ADULT) 05/21/2004    Tdap 02/17/2009, 08/18/2016, 10/20/2018    Typhoid - ViCPs 05/21/2004    Varicella 05/21/2004, 09/03/2004, 03/02/2009    Zoster Recombinant 08/31/2021, 11/16/2021          REVIEW OF SYSTEMS:    GENERAL: No weight loss, no weight gain, no fever, no fatigue, no chills, no night sweats  HEENT: No sore throat, no ear pain, no sinus pressure, no nasal congestion, no rhinorrhea, no decreased hearing, no snoring  VISION: No vision changes, no blurry vision, no double vision, no glaucoma, had bilateral cataract surgery,  + early stage bilateral non exudative age-related macular degeneration  LAST EYE EXAM:  03/07/2024  NECK: no LAD  CARDIAC: No chest pain, no palpitations, no dyspnea on exertion, no orthopnea  RESPIRATORY: No cough, no wheezing, no sputum production, no SOB  GI: No abdominal pain, no N/V, no constipation, no diarrhea, no blood in stool, (- ) family history of Colon Ca  : No dysuria, no hematuria, no frequency, no urgency, no incontinence, no testicular pain/swelling, (- ) family history of Prostate Ca  [facility] [place]: +  "myalgias, no weakness, + ankle edema about once a week, no arthralgia, no joint swelling/effusions  SKIN: No rashes, no hives, no itching, no sores  NEURO: No HA, no numbness, no tingling, no weakness, no dizziness  PSYCH: No anxiety, no depression, no irritability, no panic attacks, no s/i, no h/i, no hallucinations  ENDO: No polyuria, no polyphagia, no polydipsia  HEME: No bruising, no bleeding disorders, no signs of anemia.       PHYSICAL EXAM:    ..Visit Vitals  /84   Pulse (!) 54   Temp 98.7 °F (37.1 °C)   Ht 5' 9" (1.753 m)   Wt 105.4 kg (232 lb 4.8 oz)   SpO2 97%   BMI 34.30 kg/m²        General: Well developed, well nourished  male in no apparent distress, alert and oriented x3  Skin: No rash or abnormal lesions  HEENT: Normocephalic, PERRLA, EOMI, mouth WNL, throat WNL, nares normal, EAC and TM WNL bilaterally  Neck: FROM, no LAD, no thyroid abnormalities palpable  Chest: CTA bilaterally, no wheezes crackles or rubs  Cardiac: RRR, no murmurs, rubs, gallops  Abdomen: Soft, nontender, nondistended, NBSx4, no rebound tenderness or guarding, no HSM  Extremities: No clubbing, cyanosis, or edema. Joints WNL, +2 DP/PT pulses bilaterally  Neuro: No sensory or motor defects noted. CN II-XII intact. Gait WNL.  Genital: normal testes, no hernias  Rectal:  Deferred      1. Encounter for wellness examination in adult  Overview:  Patient presents for wellness examination.    He has been feeling well.    Patient encouraged to follow ADA diet.    Patient encouraged to increase physical activity, exercise and lose weight.  Last colonoscopy 05/2021: Dr. Andrew; normal, follow-up in 10 years.    Cardio: Dr. Anna.   Patient had eye exam yesterday with Dr Dony Solorio.  Labs pending.    09/19/2024:  Patient has been feeling well.    A1c 6.1; continue ADA diet.  Patient encouraged to make healthy food choices and limit portions.    Patient encouraged to increase exercise and lose weight.    Influenza vaccine " administered today.  His hypertension is controlled.  Cardio: Dr. Anna; patient encouraged to follow-up with him.  He has not seen him in a few years.  Continue baby aspirin.  Labs reviewed with patient.      2. Primary hypertension  Overview:  Controlled; continue current medications.  Refills sent.    Patient encouraged to increase his activity, exercise and lose weight.    Limit sodium consumption.    09/19/2024:  His blood pressure is controlled; continue current medications.  Refills sent.    Patient encouraged to increase his activity, exercise and lose weight.    Limit sodium consumption.    Orders:  -     amLODIPine (NORVASC) 5 MG tablet; See Instructions, Take 1 tablet by mouth once daily, # 90 tab(s), 3 Refill(s), Pharmacy: [facility], 177, cm, Height/Length Dosing, 08/31/21 7:34:00 CDT, 100, kg, Weight Dosing, 08/31/21 7:34:00 CDT Strength: 5 mg  Dispense: 90 tablet; Refill: 3  -     losartan-hydrochlorothiazide 100-25 mg (HYZAAR) 100-25 mg per tablet; See Instructions, Take 1 tablet by mouth once daily, # 90 tab(s), 3 Refill(s), Pharmacy: [facility], 177, cm, Height/Length Dosing, 08/31/21 7:34:00 CDT, 100, kg, Weight Dosing, 08/31/21 7:34:00 CDT Strength: 100-25 mg  Dispense: 90 tablet; Refill: 3  -     metoprolol succinate (TOPROL-XL) 100 MG 24 hr tablet; Take 1 tablet (100 mg total) by mouth once daily.  Dispense: 90 tablet; Refill: 3    3. Mixed hyperlipidemia  Overview:  Patient has been compliant with Zetia; refills sent.    Continue low-cholesterol/low-fat diet.    Patient encouraged to lose weight.    Lipid profile pending.    09/19/2024:   Total cholesterol 178, HDL 50, triglycerides 88 and .    Continue Zetia; refills sent.    Continue low-cholesterol/low-fat diet.    Patient encouraged to lose weight.      Note: Patient is statin intolerant.      Orders:  -     ezetimibe (ZETIA) 10 mg tablet; See Instructions, Take 1 tablet by mouth once daily, # 30 tab(s), 9  Refill(s), Pharmacy: [facility], 177, cm, Height/Length Dosing, 08/31/21 7:34:00 CDT, 100, kg, Weight Dosing, 08/31/21 7:34:00 CDT  Dispense: 90 tablet; Refill: 3    4. Bilateral carotid artery stenosis  Overview:  Stable; followed by Dr. Anna.    09/19/2024:  Stable; patient encouraged to follow-up with Dr. Anna and have carotid doppler done.      5. Prediabetes  Overview:  Patient encouraged to follow ADA diet; limit intake of sugary foods and refined carbohydrates.    Patient encouraged to increase physical activity, exercise and lose weight.    Foot exam performed today.  Patient had eye exam yesterday with Dr Dony Solorio.  A1c, microalbumin and lipid profile pending.  A1c 6.6.    09/19/2024:  A1c 6.1.  Microalbumin level less than 5.  Patient encouraged to follow ADA diet; limit intake of sugary foods and refined carbohydrates.    Patient encouraged to increase physical activity, exercise and lose weight.    Foot exam performed today.  Last eye exam 03/07/2024.      Orders:  -     metFORMIN (GLUCOPHAGE) 500 MG tablet; Take 1 tablet (500 mg total) by mouth daily with breakfast.  Dispense: 90 tablet; Refill: 3    6. Screening PSA (prostate specific antigen)  Overview:  Patient denies any obstructive or irritative voiding symptoms.    He denies any family history of prostate cancer.    His prostate exam is limited secondary to body habitus.    PSA pending.    09/19/2024:  Patient denies any obstructive or irritative voiding symptoms.    He denies any family history of prostate cancer.    PSA 0.91.        7. Erectile dysfunction, unspecified erectile dysfunction type  Overview:  Patient has good results with medications; refills for sildenafil and tadalafil sent.    09/19/2024: Patient states sildenafil works better; refills sent.    Orders:  -     sildenafil (REVATIO) 20 mg Tab; TAKE 2 TO 5 TABLETS BY MOUTH 30 MINUTES TO 1 HOUR PRIOR TO SEXUAL ACTIVITY  Dispense: 30 tablet; Refill: 5    8. Class 1  obesity due to excess calories with serious comorbidity and body mass index (BMI) of 34.0 to 34.9 in adult  Overview:  Patient encouraged to follow ADA diet; limit intake of sugary foods and refined carbohydrates.    Patient encouraged to limit intake of fried foods, processed foods and sugary foods.    Patient encouraged to increase physical activity, exercise and lose weight.      9. Immunization due  Overview:  Influenza vaccine 0.5 IM administered; benefits/potential risks/side effects discussed with patient.    Orders:  -     influenza (Flulaval, Fluzone, Fluarix) 45 mcg/0.5 mL IM vaccine (> or = 6 mo) 0.5 mL    Other orders  -     Discontinue: ezetimibe (ZETIA) 10 mg tablet; See Instructions, Take 1 tablet by mouth once daily, # 30 tab(s), 9 Refill(s), Pharmacy: [facility], 177, cm, Height/Length Dosing, 08/31/21 7:34:00 CDT, 100, kg, Weight Dosing, 08/31/21 7:34:00 CDT  Dispense: 90 tablet; Refill: 3  -     Discontinue: metFORMIN (GLUCOPHAGE) 500 MG tablet; Take 1 tablet (500 mg total) by mouth daily with breakfast.  Dispense: 90 tablet; Refill: 3         ..Follow up in about 1 year (around 9/19/2025) for Wellness, With labwork prior to visit.     Future Appointments   Date Time Provider Department Center   9/23/2025  2:00 PM Jonathan Ceron MD [facility]

## 2024-09-17 ENCOUNTER — LAB VISIT (OUTPATIENT)
Dept: LAB | Facility: HOSPITAL | Age: 64
End: 2024-09-17
Attending: FAMILY MEDICINE
Payer: COMMERCIAL

## 2024-09-17 DIAGNOSIS — R73.03 PREDIABETES: ICD-10-CM

## 2024-09-17 DIAGNOSIS — Z12.5 SCREENING PSA (PROSTATE SPECIFIC ANTIGEN): ICD-10-CM

## 2024-09-17 DIAGNOSIS — Z00.00 WELLNESS EXAMINATION: Primary | ICD-10-CM

## 2024-09-17 DIAGNOSIS — Z00.00 WELLNESS EXAMINATION: ICD-10-CM

## 2024-09-17 DIAGNOSIS — E11.9 TYPE 2 DIABETES MELLITUS WITHOUT COMPLICATION, WITHOUT LONG-TERM CURRENT USE OF INSULIN: ICD-10-CM

## 2024-09-17 LAB
ALBUMIN SERPL-MCNC: 3.8 G/DL (ref 3.4–4.8)
ALBUMIN/GLOB SERPL: 1.3 RATIO (ref 1.1–2)
ALP SERPL-CCNC: 62 UNIT/L (ref 40–150)
ALT SERPL-CCNC: 33 UNIT/L (ref 0–55)
ANION GAP SERPL CALC-SCNC: 7 MEQ/L
AST SERPL-CCNC: 27 UNIT/L (ref 5–34)
BACTERIA #/AREA URNS AUTO: NORMAL /HPF
BASOPHILS # BLD AUTO: 0.02 X10(3)/MCL
BASOPHILS NFR BLD AUTO: 0.5 %
BILIRUB SERPL-MCNC: 0.7 MG/DL
BILIRUB UR QL STRIP.AUTO: NEGATIVE
BUN SERPL-MCNC: 13.6 MG/DL (ref 8.4–25.7)
CALCIUM SERPL-MCNC: 9 MG/DL (ref 8.8–10)
CHLORIDE SERPL-SCNC: 103 MMOL/L (ref 98–107)
CHOLEST SERPL-MCNC: 178 MG/DL
CHOLEST/HDLC SERPL: 4 {RATIO} (ref 0–5)
CLARITY UR: CLEAR
CO2 SERPL-SCNC: 29 MMOL/L (ref 23–31)
COLOR UR AUTO: COLORLESS
CREAT SERPL-MCNC: 1.19 MG/DL (ref 0.73–1.18)
CREAT UR-MCNC: 65.1 MG/DL (ref 63–166)
CREAT/UREA NIT SERPL: 11
EOSINOPHIL # BLD AUTO: 0.14 X10(3)/MCL (ref 0–0.9)
EOSINOPHIL NFR BLD AUTO: 3.7 %
ERYTHROCYTE [DISTWIDTH] IN BLOOD BY AUTOMATED COUNT: 14.7 % (ref 11.5–17)
EST. AVERAGE GLUCOSE BLD GHB EST-MCNC: 128.4 MG/DL
GFR SERPLBLD CREATININE-BSD FMLA CKD-EPI: >60 ML/MIN/1.73/M2
GLOBULIN SER-MCNC: 2.9 GM/DL (ref 2.4–3.5)
GLUCOSE SERPL-MCNC: 104 MG/DL (ref 82–115)
GLUCOSE UR QL STRIP: NORMAL
HBA1C MFR BLD: 6.1 %
HCT VFR BLD AUTO: 44.4 % (ref 42–52)
HDLC SERPL-MCNC: 50 MG/DL (ref 35–60)
HGB BLD-MCNC: 14.3 G/DL (ref 14–18)
HGB UR QL STRIP: NEGATIVE
IMM GRANULOCYTES # BLD AUTO: 0.02 X10(3)/MCL (ref 0–0.04)
IMM GRANULOCYTES NFR BLD AUTO: 0.5 %
KETONES UR QL STRIP: NEGATIVE
LDLC SERPL CALC-MCNC: 110 MG/DL (ref 50–140)
LEUKOCYTE ESTERASE UR QL STRIP: NEGATIVE
LYMPHOCYTES # BLD AUTO: 1.72 X10(3)/MCL (ref 0.6–4.6)
LYMPHOCYTES NFR BLD AUTO: 45.3 %
MCH RBC QN AUTO: 24.9 PG (ref 27–31)
MCHC RBC AUTO-ENTMCNC: 32.2 G/DL (ref 33–36)
MCV RBC AUTO: 77.2 FL (ref 80–94)
MICROALBUMIN UR-MCNC: <5 UG/ML
MICROALBUMIN/CREAT RATIO PNL UR: NORMAL
MONOCYTES # BLD AUTO: 0.38 X10(3)/MCL (ref 0.1–1.3)
MONOCYTES NFR BLD AUTO: 10 %
NEUTROPHILS # BLD AUTO: 1.52 X10(3)/MCL (ref 2.1–9.2)
NEUTROPHILS NFR BLD AUTO: 40 %
NITRITE UR QL STRIP: NEGATIVE
NRBC BLD AUTO-RTO: 0 %
PH UR STRIP: 7.5 [PH]
PLATELET # BLD AUTO: 204 X10(3)/MCL (ref 130–400)
PMV BLD AUTO: 9.3 FL (ref 7.4–10.4)
POTASSIUM SERPL-SCNC: 3.6 MMOL/L (ref 3.5–5.1)
PROT SERPL-MCNC: 6.7 GM/DL (ref 5.8–7.6)
PROT UR QL STRIP: NEGATIVE
PSA SERPL-MCNC: 0.91 NG/ML
RBC # BLD AUTO: 5.75 X10(6)/MCL (ref 4.7–6.1)
RBC #/AREA URNS AUTO: NORMAL /HPF
SODIUM SERPL-SCNC: 139 MMOL/L (ref 136–145)
SP GR UR STRIP.AUTO: 1.01 (ref 1–1.03)
SQUAMOUS #/AREA URNS LPF: NORMAL /HPF
TRIGL SERPL-MCNC: 88 MG/DL (ref 34–140)
UROBILINOGEN UR STRIP-ACNC: NORMAL
VLDLC SERPL CALC-MCNC: 18 MG/DL
WBC # BLD AUTO: 3.8 X10(3)/MCL (ref 4.5–11.5)
WBC #/AREA URNS AUTO: NORMAL /HPF

## 2024-09-17 PROCEDURE — 80053 COMPREHEN METABOLIC PANEL: CPT

## 2024-09-17 PROCEDURE — 81001 URINALYSIS AUTO W/SCOPE: CPT

## 2024-09-17 PROCEDURE — 83036 HEMOGLOBIN GLYCOSYLATED A1C: CPT

## 2024-09-17 PROCEDURE — 85025 COMPLETE CBC W/AUTO DIFF WBC: CPT

## 2024-09-17 PROCEDURE — 81015 MICROSCOPIC EXAM OF URINE: CPT

## 2024-09-17 PROCEDURE — 82570 ASSAY OF URINE CREATININE: CPT

## 2024-09-17 PROCEDURE — 84153 ASSAY OF PSA TOTAL: CPT

## 2024-09-17 PROCEDURE — 80061 LIPID PANEL: CPT

## 2024-09-17 PROCEDURE — 36415 COLL VENOUS BLD VENIPUNCTURE: CPT

## 2024-09-19 ENCOUNTER — OFFICE VISIT (OUTPATIENT)
Dept: PRIMARY CARE CLINIC | Facility: CLINIC | Age: 64
End: 2024-09-19
Payer: COMMERCIAL

## 2024-09-19 ENCOUNTER — TELEPHONE (OUTPATIENT)
Dept: PRIMARY CARE CLINIC | Facility: CLINIC | Age: 64
End: 2024-09-19

## 2024-09-19 VITALS
HEIGHT: 69 IN | WEIGHT: 232.31 LBS | HEART RATE: 54 BPM | OXYGEN SATURATION: 97 % | SYSTOLIC BLOOD PRESSURE: 128 MMHG | BODY MASS INDEX: 34.41 KG/M2 | DIASTOLIC BLOOD PRESSURE: 84 MMHG | TEMPERATURE: 99 F

## 2024-09-19 DIAGNOSIS — I65.23 BILATERAL CAROTID ARTERY STENOSIS: ICD-10-CM

## 2024-09-19 DIAGNOSIS — E66.09 CLASS 1 OBESITY DUE TO EXCESS CALORIES WITH SERIOUS COMORBIDITY AND BODY MASS INDEX (BMI) OF 34.0 TO 34.9 IN ADULT: ICD-10-CM

## 2024-09-19 DIAGNOSIS — I10 PRIMARY HYPERTENSION: ICD-10-CM

## 2024-09-19 DIAGNOSIS — E78.2 MIXED HYPERLIPIDEMIA: ICD-10-CM

## 2024-09-19 DIAGNOSIS — Z00.00 ENCOUNTER FOR WELLNESS EXAMINATION IN ADULT: Primary | ICD-10-CM

## 2024-09-19 DIAGNOSIS — R73.03 PREDIABETES: ICD-10-CM

## 2024-09-19 DIAGNOSIS — Z23 IMMUNIZATION DUE: ICD-10-CM

## 2024-09-19 DIAGNOSIS — N52.9 ERECTILE DYSFUNCTION, UNSPECIFIED ERECTILE DYSFUNCTION TYPE: ICD-10-CM

## 2024-09-19 DIAGNOSIS — Z12.5 SCREENING PSA (PROSTATE SPECIFIC ANTIGEN): ICD-10-CM

## 2024-09-19 RX ORDER — EZETIMIBE 10 MG/1
TABLET ORAL
Qty: 90 TABLET | Refills: 3 | Status: SHIPPED | OUTPATIENT
Start: 2024-09-19 | End: 2024-09-19 | Stop reason: SDUPTHER

## 2024-09-19 RX ORDER — AMLODIPINE BESYLATE 5 MG/1
TABLET ORAL
Qty: 90 TABLET | Refills: 3 | Status: SHIPPED | OUTPATIENT
Start: 2024-09-19

## 2024-09-19 RX ORDER — METOPROLOL SUCCINATE 100 MG/1
100 TABLET, EXTENDED RELEASE ORAL DAILY
Qty: 90 TABLET | Refills: 3 | Status: SHIPPED | OUTPATIENT
Start: 2024-09-19 | End: 2025-09-14

## 2024-09-19 RX ORDER — SILDENAFIL CITRATE 20 MG/1
TABLET ORAL
Qty: 30 TABLET | Refills: 5 | Status: SHIPPED | OUTPATIENT
Start: 2024-09-19

## 2024-09-19 RX ORDER — EZETIMIBE 10 MG/1
TABLET ORAL
Qty: 90 TABLET | Refills: 3 | Status: SHIPPED | OUTPATIENT
Start: 2024-09-19

## 2024-09-19 RX ORDER — METFORMIN HYDROCHLORIDE 500 MG/1
500 TABLET ORAL
Qty: 90 TABLET | Refills: 3 | Status: SHIPPED | OUTPATIENT
Start: 2024-09-19 | End: 2024-09-19 | Stop reason: SDUPTHER

## 2024-09-19 RX ORDER — METFORMIN HYDROCHLORIDE 500 MG/1
500 TABLET ORAL
Qty: 90 TABLET | Refills: 3 | Status: SHIPPED | OUTPATIENT
Start: 2024-09-19 | End: 2025-09-14

## 2024-09-19 RX ORDER — LOSARTAN POTASSIUM AND HYDROCHLOROTHIAZIDE 25; 100 MG/1; MG/1
TABLET ORAL
Qty: 90 TABLET | Refills: 3 | Status: SHIPPED | OUTPATIENT
Start: 2024-09-19